# Patient Record
Sex: FEMALE | Race: WHITE
[De-identification: names, ages, dates, MRNs, and addresses within clinical notes are randomized per-mention and may not be internally consistent; named-entity substitution may affect disease eponyms.]

---

## 2019-07-07 ENCOUNTER — HOSPITAL ENCOUNTER (EMERGENCY)
Dept: HOSPITAL 65 - ER | Age: 69
Discharge: HOME | End: 2019-07-07
Payer: MEDICARE

## 2019-07-07 VITALS — BODY MASS INDEX: 29.99 KG/M2 | HEIGHT: 65 IN | WEIGHT: 180 LBS

## 2019-07-07 VITALS — DIASTOLIC BLOOD PRESSURE: 51 MMHG | SYSTOLIC BLOOD PRESSURE: 143 MMHG

## 2019-07-07 VITALS — SYSTOLIC BLOOD PRESSURE: 143 MMHG | DIASTOLIC BLOOD PRESSURE: 51 MMHG

## 2019-07-07 VITALS — DIASTOLIC BLOOD PRESSURE: 73 MMHG | SYSTOLIC BLOOD PRESSURE: 126 MMHG

## 2019-07-07 VITALS — DIASTOLIC BLOOD PRESSURE: 43 MMHG | SYSTOLIC BLOOD PRESSURE: 118 MMHG

## 2019-07-07 VITALS — SYSTOLIC BLOOD PRESSURE: 136 MMHG | DIASTOLIC BLOOD PRESSURE: 64 MMHG

## 2019-07-07 DIAGNOSIS — Z90.710: ICD-10-CM

## 2019-07-07 DIAGNOSIS — J06.9: Primary | ICD-10-CM

## 2019-07-07 DIAGNOSIS — N20.0: ICD-10-CM

## 2019-07-07 DIAGNOSIS — Z90.49: ICD-10-CM

## 2019-07-07 DIAGNOSIS — I10: ICD-10-CM

## 2019-07-07 DIAGNOSIS — E11.9: ICD-10-CM

## 2019-07-07 LAB
ALP INTEST CFR SERPL: 109 U/L (ref 50–136)
ALT SERPL-CCNC: 19 U/L (ref 12–78)
ANISOCYTOSIS BLD QL SMEAR: (no result)
APPEARANCE UR: CLEAR
ARTERIAL PATENCY WRIST A: POSITIVE
AST SERPL-CCNC: 24 U/L (ref 0–35)
BASOPHILS # BLD AUTO: 0 10^3/UL (ref 0–0.1)
BASOPHILS NFR BLD AUTO: 0.1 % (ref 0–0.2)
BILIRUB UR STRIP.AUTO-MCNC: NEGATIVE MG/DL
CALCIUM SERPL-MCNC: 9.8 MG/DL (ref 8.4–10.5)
CO2 BLDA-SCNC: 30.5 MMOL/L (ref 20–32)
COLOR UR: YELLOW
EOSINOPHIL # BLD AUTO: 0 10^3/UL (ref 0–0.2)
EOSINOPHIL NFR BLD AUTO: 0.1 % (ref 0–5)
ERYTHROCYTE [DISTWIDTH] IN BLOOD BY AUTOMATED COUNT: 14.7 % (ref 11.5–14.5)
GLUCOSE PRE 100 G GLC PO SERPL-MCNC: 187 MG/DL (ref 70–110)
HGB BLD-MCNC: 13.8 G/DL (ref 12–15)
LYMPHOCYTES # BLD AUTO: 0.7 10^3/UL (ref 1–4.8)
LYMPHOCYTES NFR BLD AUTO: 4.3 % (ref 24–44)
LYMPHOCYTES NFR BLD: 5 % (ref 25–36)
MCH RBC QN AUTO: 29.8 PG (ref 26–34)
MCHC RBC AUTO-ENTMCNC: 33.8 G/DL (ref 33–37)
MCV RBC AUTO: 88.1 FL (ref 78–100)
MONOCYTES # BLD AUTO: 1.2 10^3/UL (ref 0.3–0.8)
MONOCYTES NFR BLD AUTO: 7.3 % (ref 5–12)
MONOCYTES NFR BLD: 7 % (ref 3–9)
NEUTROPHILS # BLD AUTO: 13.8 10^3/UL (ref 1.8–7.7)
NEUTROPHILS NFR BLD AUTO: 87.9 % (ref 41–85)
NEUTS SEG NFR BLD: 88 % (ref 31–76)
PLATELET # BLD AUTO: 196 10^3/UL (ref 150–400)
PMV BLD AUTO: 10.9 FL (ref 7.8–11)
UROBILINOGEN UR QL STRIP.AUTO: NORMAL
WBC # BLD AUTO: 15.8 10^3/UL (ref 4.5–11)

## 2019-07-07 PROCEDURE — 71046 X-RAY EXAM CHEST 2 VIEWS: CPT

## 2019-07-07 PROCEDURE — 83690 ASSAY OF LIPASE: CPT

## 2019-07-07 PROCEDURE — 96360 HYDRATION IV INFUSION INIT: CPT

## 2019-07-07 PROCEDURE — 87045 FECES CULTURE AEROBIC BACT: CPT

## 2019-07-07 PROCEDURE — 80053 COMPREHEN METABOLIC PANEL: CPT

## 2019-07-07 PROCEDURE — 83605 ASSAY OF LACTIC ACID: CPT

## 2019-07-07 PROCEDURE — 87086 URINE CULTURE/COLONY COUNT: CPT

## 2019-07-07 PROCEDURE — 87040 BLOOD CULTURE FOR BACTERIA: CPT

## 2019-07-07 PROCEDURE — 36415 COLL VENOUS BLD VENIPUNCTURE: CPT

## 2019-07-07 PROCEDURE — 74176 CT ABD & PELVIS W/O CONTRAST: CPT

## 2019-07-07 PROCEDURE — 99285 EMERGENCY DEPT VISIT HI MDM: CPT

## 2019-07-07 PROCEDURE — 81000 URINALYSIS NONAUTO W/SCOPE: CPT

## 2019-07-07 PROCEDURE — 85025 COMPLETE CBC W/AUTO DIFF WBC: CPT

## 2019-07-07 PROCEDURE — 83630 LACTOFERRIN FECAL (QUAL): CPT

## 2019-07-07 PROCEDURE — 87230 TOXIN/ANTITOXIN ASY TISS CUL: CPT

## 2019-07-07 NOTE — DIREP
PROCEDURE:CT ABDOMEN/PELVIS W/O CONTRAST

 

COMPARISON:None.

 

INDICATIONS:Generalized pain and diarrhea

 

TECHNIQUE:Axial images were created through the abdomen and pelvis without 

intravenous contrast material. 

No oral contrast was administered.

Sagittal and coronal reconstructions were performed from source images.  The 

study was reviewed on abdominal, lung, liver and bone windows.

 

FINDINGS:

LUNG BASES:Normal.  No visible pulmonary or pleural disease.  

LIVER:Normal.  No significant liver lesions are identified.  

BILIARY:Status post cholecystectomy.

PANCREAS:Normal.  No lesion, fluid collection, ductal dilatation, or atrophy.  

 

SPLEEN:Normal.  No enlargement or focal lesion.  Calcified granulomas in the 

spleen.

ADRENALS:Normal.  No mass or enlargement.  

URINARY TRACT:Note is made of a renal stone inferior pole of the right kidney 

measuring 10 x 7 mm in size.  A tiny renal stone is also seen in the midpole of 

the left kidney as seen on image number 39.  No hydronephrosis is seen.

AORTA/VASCULAR:Normal.  No aneurysm. 

RETROPERITONEUM:Normal.  No mass or adenopathy.  

BOWEL/MESENTERY:Normal.  There is no intestinal obstruction, free fluid, free 

air or mesenteric inflammatory changes.  Numerous small lymph nodes identified 

in the right lower quadrant.  The appendix has not been visualized.  No 

pericecal inflammatory changes, diverticulitis, free air or free fluid is seen.

ABDOMINAL WALL:Normal.  No mass or hernia.  

PELVIC ORGANS:The uterus appears to be surgically absent.

BONES:Normal for age.  No bony lesion or acute fracture.  

OTHER:Negative.  

 

CONCLUSION:

 

 

Right renal stone, and a tiny stone on the left side, no hydronephrosis is 

seen.

 

Status post cholecystectomy.  No pancreatitis or pseudocysts are seen.

 

Calcified granulomas in the spleen.

 

Nonvisualization of the appendix, no diverticulitis, free air or free fluid is 

seen.

 

Status post hysterectomy.

 

 

 

Dictated by: Gareth Schreiber MD on 07/07/2019 at 09:47 AM     

Electronically Signed By: Gareth Schreiber MD on 07/07/2019 at 09:52 AM

## 2019-07-07 NOTE — NUR
ARRIVAL

PT CAME TO ED TODAY WITH COMPLAINT OF DIARRHEA FOR SEVERAL DAYS, FEVER/CHILLS 
STARTING 2 DAYS ALONG WITH CONGESTION AND COUGH. BSM ON, REPORT GIVEN TO EDP.

## 2019-07-07 NOTE — DIREP
PROCEDURE:CHEST 2 VIEWS

 

COMPARISON:None.

 

INDICATIONS:Cough

 

FINDINGS:

LUNGS/PLEURA:No significant pulmonary parenchymal abnormalities. No effusions. 

 Lungs are clear.  No pneumonia, heart failure or effusions are seen.

VASCULATURE:Normal.  Unremarkable pulmonary vasculature.

CARDIAC:Normal.  No cardiac silhouette abnormality or cardiomegaly.  Aorta is 

tortuous and shows atheromatous calcifications.

MEDIASTINUM:Normal.  No visible mass or adenopathy. 

BONES:Normal.  No fracture or visible bony lesion.  Mild DJD in the spine.

OTHER:Negative.  

 

CONCLUSION:No acute disease.  No pneumonia is seen.

 

 

 

Dictated by: Gareth Schreiber MD on 07/07/2019 at 09:53 AM     

Electronically Signed By: Gareth Schreiber MD on 07/07/2019 at 09:54 AM

## 2019-07-07 NOTE — NUR
DISMISSAL

IV REMOVED FROM RIGHT AC, CATHETER INTACT. PT TOLERATED PROCEDURE WELL. PT 
DISCHARGED IN STABLE CONDITION.

## 2019-07-07 NOTE — ER.PDOC
General


Chief Complaint:  Nausea,Vomiting,Diarrhea


Stated Complaint:  NVD


Time seen by MD:  08:39


Source:  patient


Exam Limitations:  no limitations





History of Present Illness


Initial Comments


Diarrhea, fever and cough for 5 days.


Severity/Quality:  moderate, cramping


Associated Symptoms (diarrhea):  watery


Allergies:  


Coded Allergies:  


     No Known Drug Allergies (Verified  Allergy, Unknown, 7/7/19)


Vital Signs





First Vital Signs








  Date Time  Temp Pulse Resp B/P (MAP) Pulse Ox O2 Delivery O2 Flow Rate FiO2


 


7/7/19 08:24 100.0 97 14     





 100.0       


 


7/7/19 08:24     93 Room Air  


 


7/7/19 08:27    126/73 (90)    








Last Vital Signs








  Date Time  Temp Pulse Resp B/P (MAP) Pulse Ox O2 Delivery O2 Flow Rate FiO2


 


7/7/19 08:27 100.0 97 14 126/73 (90) 93 Room Air  





 100.0       











Past Medical History


Medical History:  diabetes, hypertension, other


Surgical History:  cholecystectomy, hysterectomy, knee





Social History


Smoking:  non-smoker


Alcohol Use:  none


Drug Use:  none





Constitutional:  see HPI


Respiratory:  see HPI


Cardiovascular:  no symptoms reported


Gastrointestinal:  see HPI


Genitourinary:  no symptoms reported


All Other Systems:  Reviewed and Negative





Physical Exam


General Appearance:  No Apparent Distress, WD/WN


Neck:  Non-Tender, Full Range of Motion, Supple, Normal Inspection


Respiratory:  chest non-tender, lungs clear, normal breath sounds, no 

respiratory distress, no accessory muscle use


Cardiovascular:  Normal Peripheral Pulses, Regular Rate, Rhythm, No Edema, No 

Gallop, No JVD, No Murmur


Gastrointestinal:  Normal Bowel Sounds, No Organomegaly, No Pulsatile Mass, 

Tenderness (lower abdomen)


Back:  Normal Inspection, No CVA Tenderness, No Vertebral Tenderness


Extremities:  Normal Range of Motion, Non-Tender, Normal Inspection, No Pedal 

Edema, No Calf Tenderness, Normal Capillary Refill, Pelvis Stable


Neurologic/Psychiatric:  CNs II-XII NML as Tested, No Motor/Sensory Deficits, 

Alert, Normal Mood/Affect, Oriented x 3


Skin:  Normal Color, Warm/Dry





Results/Orders


Results/Orders





Orders - VANESSA MEEK MD


Cbc With Auto Diff (7/7/19 08:34)


Comprehensive Metabolic Panel (7/7/19 08:34)


Lipase (7/7/19 08:34)


Urinalysis (7/7/19 08:34)


Ct Abd/Pelvis Wo Iv Contrast (7/7/19 08:34)


Xr Chest 2v (7/7/19 08:34)


Blood Culture (7/7/19 08:34)


Lactic Acid(Rt) (7/7/19 08:34)


Lactoferrin Fecal Qual(Ml) (7/7/19 08:34)


Stool Culture(Ml) (7/7/19 08:34)


Clostridium Difficile Panel (7/7/19 08:34)


0.9 % Sodium Chloride (Ns 1000ml) (7/7/19 08:34)


Loperamide Hcl (Imodium) (7/7/19 08:34)


0.9 % Sodium Chloride (Ns 1000ml) (7/7/19 08:52)


Loperamide Hcl (Imodium) (7/7/19 08:53)


Urine Culture (7/7/19 08:10)





Vital Signs








  Date Time  Temp Pulse Resp B/P (MAP) Pulse Ox O2 Delivery O2 Flow Rate FiO2


 


7/7/19 08:27 100.0 97 14 126/73 (90) 93 Room Air  





 100.0       


 


7/7/19 08:24 100.0 97 14  93 Room Air  





 100.0       


 


7/7/19 08:24 100.0 97 14     





 100.0       








Administered Medications








 Medications


  (Trade)  Dose


 Ordered  Sig/Etienne


 Route


 PRN Reason  Start Time


 Stop Time Status Last Admin


Dose Admin


 


 Loperamide HCl


  (Imodium)  4 mg  STAT  STAT


 PO


   7/7/19 08:34


 7/7/19 08:40 DC 7/7/19 08:59


4 MG


 


 Sodium Chloride  1,000 ml @ 


 1,200 mls/hr  Q50M STAT


 IV


   7/7/19 08:34


 7/7/19 09:23 DC 7/7/19 08:59


1,200 MLS/HR








                                Laboratory Tests








Test


 7/7/19


08:10 7/7/19


08:34 7/7/19


08:37 7/7/19


08:45


 


Urine Collection Type UNKNOWN     


 


Urine Color


 YELLOW


(YELLOW) 


 


 





 


Urine Appearance CLEAR (CLEAR)     


 


Urine Bilirubin


 NEGATIVE MG/DL


(NEGATIVE) 


 


 





 


Urine Ketones


 NEGATIVE


(NEGATIVE) 


 


 





 


Urine Specific Gravity


 1.015


(1.005-1.035) 


 


 





 


Urine pH 6.5 (5.0-6.0)     


 


Urine Protein


 NEGATIVE


(NEGATIVE) 


 


 





 


Urine Urobilinogen


 NORMAL


(NEGATIVE) 


 


 





 


Urine Nitrate


 NEGATIVE


(NEGATIVE) 


 


 





 


Urine Leukocyte Esterase


 NEGATIVE


(NEGATIVE) 


 


 





 


Urine Blood


 50 2+


(NEGATIVE)  H 


 


 





 


Urine RBC


 5-10 RBC/HPF


(NONE SEEN)  H 


 


 





 


Urine WBC


 0-2 WBC/HPF


(0-2) 


 


 





 


Urine Squamous Epithelial


Cells FEW #/HPF


(FEW) 


 


 





 


Urine Bacteria


 RARE (NONE


SEEN) 


 


 





 


Urine Glucose


 NORMAL


(NEGATIVE) 


 


 





 


Blood Gas Sample Site


 


 LEFT RADIAL


ARTERY 


 





 


Crispin Test  POSITIVE    


 


Lactic Acid (Blood Gas)


 


 1.8 mmol/1


(0.50-2.0) 


 





 


Blood Gas Temperature  37    


 


Stool Lactoferrin   POSITIVE   


 


Clostridium difficile Screen   NEGATIVE   


 


Clostridium Difficile Toxin A


& B 


 


 NEGATIVE  


 





 


White Blood Count


 


 


 


 15.8 10^3/uL


(4.5-11.0)  H


 


Red Blood Count


 


 


 


 4.63 10^6/uL


(4.00-5.20)


 


Hemoglobin


 


 


 


 13.8 g/dL


(12.0-15.0)


 


Hematocrit


 


 


 


 40.8 %


(36.0-46.0)


 


Mean Corpuscular Volume


 


 


 


 88.1 fL


()


 


Mean Corpuscular Hemoglobin


 


 


 


 29.8 pg


(26-34)


 


Mean Corpuscular Hemoglobin


Concent 


 


 


 33.8 g/dL


(33-37)


 


Red Cell Distribution Width


 


 


 


 14.7 %


(11.5-14.5)  H


 


Platelet Count


 


 


 


 196 10^3/uL


(150-400)


 


Mean Platelet Volume


 


 


 


 10.9 fL


(7.8-11.0)


 


Neutrophils (%) (Auto)


 


 


 


 87.9 %


(41.0-85.0)  H


 


Lymphocytes (%) (Auto)


 


 


 


 4.3 %


(24.0-44.0)  *L


 


Monocytes (%) (Auto)


 


 


 


 7.3 %


(5.0-12.0)


 


Neutrophils # (Auto)


 


 


 


 13.8 10^3/uL


(1.8-7.7)  H


 


Lymphocytes # (Auto)


 


 


 


 0.7 10^3/uL


(1.0-4.8)  L


 


Monocytes # (Auto)


 


 


 


 1.2 10^3/uL


(0.3-0.8)  H


 


Absolute Immature Granulocyte


(auto 


 


 


 0.05 10^3 u/L


(0-2)


 


Differential Total Cells


Counted 


 


 


 100 #CELLS  





 


Immature Granulocytes %


 


 


 


 0.30 %


(0.00-0.50)


 


Eosinophils %


 


 


 


 0.1 %


(0.0-5.0)


 


Basophils %


 


 


 


 0.1 %


(0.0-0.2)


 


Segmented Neutrophils    88 % (31-76)  H


 


Lymphocytes    5 % (25-36)  L


 


Monocytes    7 % (3-9)  


 


Basophils #


 


 


 


 0.0 10^3/uL


(0.0-0.1)


 


Platelet Estimate    ADEQUATE  


 


Platelet Morphology    NORMAL  


 


Anisocytosis    2+ (NEGATIVE)  


 


Eosinophil Count


 


 


 


 0.0 10^3/uL


(0.0-0.2)


 


Sodium Level


 


 


 


 139 mmol/L


(132-145)


 


Potassium Level


 


 


 


 3.3 mmol/L


(3.6-5.2)  L


 


Chloride Level


 


 


 


 99.0 mmol/L


()


 


Carbon Dioxide Level


 


 


 


 30.5 mmol/L


(20.0-32)


 


Anion Gap    12.8  


 


Blood Urea Nitrogen


 


 


 


 13 mg/dL


(7-18)


 


Creatinine


 


 


 


 0.88 mg/dL


(0.59-1.40)


 


Estimated GFR (


American) 


 


 


 77.1 (>/=60)  





 


BUN/Creatinine Ratio    14.0  


 


Glucose Level


 


 


 


 187 mg/dL


()  H


 


Calcium Level


 


 


 


 9.8 mg/dL


(8.4-10.5)


 


Total Bilirubin


 


 


 


 0.8 mg/dL


(0.2-1.0)


 


Aspartate Amino Transferase


(AST) 


 


 


 24 U/L (0-35)  





 


Alanine Aminotransferase (ALT)


 


 


 


 19 U/L (12-78)





 


Alkaline Phosphatase


 


 


 


 109 U/L


()


 


Total Protein


 


 


 


 7.8 g/dL


(6.4-8.2)


 


Albumin


 


 


 


 3.8 g/dL


(3.4-5.0)


 


Globulin    4.0  


 


Lipase


 


 


 


 119 U/L


(114-286)











Progress


Progress


CT abdomen/pelvis: Right renal stone, and a tiny stone on the left side, no 

hydronephrosis is 


seen.


 


Status post cholecystectomy.  No pancreatitis or pseudocysts are seen.


 


Calcified granulomas in the spleen.


 


Nonvisualization of the appendix, no diverticulitis, free air or free fluid is 


seen.


 


Status post hysterectomy.





EKG/XRAY/CT/US


XRAY:  chest (No active disease)





Departure


Time of Disposition:  11:16


Disposition:  01 HOME, SELF-CARE


Impression:  


   Primary Impression:  


   Acute diarrhea


   Additional Impressions:  


   URI, acute


   Renal calculus, right


Condition:  Stable


Referrals:  


PCP,UNKNOWN (PCP)


PRIMARY CARE PROVIDER





Additional Instructions:  


Cipro


Flagyl


Imodium


Mucinex DM OTC as directed


F/U with PCP in 3-4 days


F/U with Urologist this week


Duration or Time Spent with Pa:  90 mins





Problem Qualifiers











VANESSA MEEK MD                 Jul 7, 2019 08:41